# Patient Record
Sex: FEMALE | Race: WHITE | HISPANIC OR LATINO | Employment: FULL TIME | URBAN - METROPOLITAN AREA
[De-identification: names, ages, dates, MRNs, and addresses within clinical notes are randomized per-mention and may not be internally consistent; named-entity substitution may affect disease eponyms.]

---

## 2023-09-23 ENCOUNTER — HOSPITAL ENCOUNTER (EMERGENCY)
Facility: HOSPITAL | Age: 43
Discharge: HOME | End: 2023-09-23
Attending: EMERGENCY MEDICINE
Payer: COMMERCIAL

## 2023-09-23 VITALS
TEMPERATURE: 99.3 F | WEIGHT: 170 LBS | SYSTOLIC BLOOD PRESSURE: 127 MMHG | BODY MASS INDEX: 30.12 KG/M2 | HEART RATE: 71 BPM | RESPIRATION RATE: 17 BRPM | DIASTOLIC BLOOD PRESSURE: 76 MMHG | HEIGHT: 63 IN | OXYGEN SATURATION: 100 %

## 2023-09-23 DIAGNOSIS — H69.83 DYSFUNCTION OF BOTH EUSTACHIAN TUBES: ICD-10-CM

## 2023-09-23 DIAGNOSIS — J34.89 SINUS PRESSURE: Primary | ICD-10-CM

## 2023-09-23 DIAGNOSIS — H69.93 DYSFUNCTION OF BOTH EUSTACHIAN TUBES: ICD-10-CM

## 2023-09-23 PROCEDURE — 99282 EMERGENCY DEPT VISIT SF MDM: CPT

## 2023-09-23 RX ORDER — METHYLPREDNISOLONE 4 MG/1
TABLET ORAL
Qty: 21 TABLET | Refills: 0 | Status: SHIPPED | OUTPATIENT
Start: 2023-09-23 | End: 2023-09-24 | Stop reason: SDUPTHER

## 2023-09-23 NOTE — ED NOTES
"Pain on forehead 2 months ago, visited a clinic and was dxed with sinusitis. Pt given amoxicillin for 14 days. Ibuprofen for pain, was effective.    After 10 days of amox ears began to \"feel clogged\"    When pt bends head foreword or backwards pt reports head feeling \"heavy\"    Pt reports that forehead feels \"heavy\", patient endorses \"fullness\" and \"pressure\" associated with this pain.      "

## 2023-09-23 NOTE — ED NOTES
Pt to er via pv with c/o HA and bilateral ear pain x2 months. Pt went to a clinic where they prescribed her antibiotics but has not found relief. Pt is Maltese speaking.

## 2023-09-23 NOTE — ED PROVIDER NOTES
EMERGENCY DEPARTMENT ENCOUNTER    Room Number:  07/07  PCP: No primary care provider on file.  Historian: Patient      HPI:  Chief Complaint: Facial pain, ear pain  A complete HPI/ROS/PMH/PSH/SH/FH are unobtainable due to: Nothing  Context: Regina William is a 43 y.o. female who presents to the ED c/o facial pain and bilateral ear pain.  Patient states that the pain is in her forehead in the middle of her face in both ears.  She completed a 14-day course of amoxicillin which she reports provided temporary relief but now symptoms are worse again.  She denies fever or chills.  She denies neck pain or neck stiffness.  She denies photophobia.  She reports that when she bends forward she feels a pressure in her forehead also.            PAST MEDICAL HISTORY  Active Ambulatory Problems     Diagnosis Date Noted    No Active Ambulatory Problems     Resolved Ambulatory Problems     Diagnosis Date Noted    No Resolved Ambulatory Problems     No Additional Past Medical History         PAST SURGICAL HISTORY  No past surgical history on file.      FAMILY HISTORY  No family history on file.      SOCIAL HISTORY         ALLERGIES  Patient has no known allergies.        REVIEW OF SYSTEMS  Review of Systems   Review of all 14 systems is negative other than stated in the HPI above.      PHYSICAL EXAM  ED Triage Vitals [09/23/23 1841]   Temp Heart Rate Resp BP SpO2   99.3 °F (37.4 °C) 91 17 139/87 100 %      Temp src Heart Rate Source Patient Position BP Location FiO2 (%)   -- -- -- -- --       Physical Exam      GENERAL: Well-appearing, no acute distress  HENT: nares patent oropharynx clear without erythema or exudate.  There is mild frontal sinus tenderness and maxillary sinus tenderness, TMs normal-appearing bilaterally  EYES: no scleral icterus, EOMI  CV: regular rhythm, normal rate  RESPIRATORY: normal effort  ABDOMEN: soft, nondistended  MUSCULOSKELETAL: no deformity  NEURO: alert, moves all extremities, follows  commands, cranial nerves II through XII grossly intact, speech fluent and clear  PSYCH:  calm, cooperative  SKIN: warm, dry, no rash    Vital signs and nursing notes reviewed.          LAB RESULTS  No results found for this or any previous visit (from the past 24 hour(s)).    Ordered the above labs and reviewed the results.        RADIOLOGY  No Radiology Exams Resulted Within Past 24 Hours    Ordered the above noted radiological studies. Reviewed by me in PACS.            PROCEDURES  Procedures            MEDICATIONS GIVEN IN ER  Medications - No data to display                MEDICAL DECISION MAKING, PROGRESS, and CONSULTS    All labs have been independently reviewed by me.  All radiology studies have been reviewed by me and I have also reviewed the radiology report.   EKG's independently viewed and interpreted by me.  Discussion below represents my analysis of pertinent findings related to patient's condition, differential diagnosis, treatment plan and final disposition.        Orders placed during this visit:  No orders of the defined types were placed in this encounter.            Differential diagnosis includes but is not limited to:    Sinusitis  Eustachian tube dysfunction        Independent interpretation of labs, radiology studies, and discussions with consultants:     Patient with persistent frontal and maxillary sinus pain, bilateral ear pain for several weeks.  She has completed an appropriate course of amoxicillin.  She is not having fever.  I do not think that she would benefit from additional antibiotic therapy.  Her TMs are normal-appearing bilaterally.  I recommended a course of oral steroids and beginning an intranasal steroid daily.  Follow-up with ENT as needed.    DIAGNOSIS  Final diagnoses:   Sinus pressure   Dysfunction of both eustachian tubes         DISPOSITION  DISCHARGE    Patient discharged in stable condition.    Reviewed implications of results, diagnosis, meds, responsibility to  follow up, warning signs and symptoms of possible worsening, potential complications and reasons to return to ER.    Patient/Family voiced understanding of above instructions.    Discussed plan for discharge, as there is no emergent indication for admission. Patient referred to primary care provider for BP management due to today's BP. Pt/family is agreeable and understands need for follow up and repeat testing.  Pt is aware that discharge does not mean that nothing is wrong but it indicates no emergency is present that requires admission and they must continue care with follow-up as given below or physician of their choice.     FOLLOW-UP  No follow-up provider specified.       Medication List        New Prescriptions      methylPREDNISolone 4 MG dose pack  Commonly known as: MEDROL  Take as directed on package instructions.               Where to Get Your Medications        You can get these medications from any pharmacy    Bring a paper prescription for each of these medications  methylPREDNISolone 4 MG dose pack                   Latest Documented Vital Signs:  As of 19:53 EDT  BP- 141/90 HR- 81 Temp- 99.3 °F (37.4 °C) O2 sat- 100%              --    Please note that portions of this were completed with a voice recognition program.       Note Disclaimer: At [facility], we believe that sharing information builds trust and better relationships. You are receiving this note because you are receiving care at [facility] or recently visited. It is possible you will see health information before a provider has talked with you about it. This kind of information can be easy to misunderstand. To help you fully understand what it means for your health, we urge you to discuss this note with your provider.             Carlos Steward MD  09/23/23 1955

## 2023-09-24 RX ORDER — METHYLPREDNISOLONE 4 MG/1
TABLET ORAL
Qty: 21 TABLET | Refills: 0 | Status: SHIPPED | OUTPATIENT
Start: 2023-09-24

## 2023-09-24 RX ORDER — METHYLPREDNISOLONE 4 MG/1
TABLET ORAL
Qty: 21 TABLET | Refills: 0 | Status: SHIPPED | OUTPATIENT
Start: 2023-09-24 | End: 2023-09-24